# Patient Record
Sex: FEMALE | Race: BLACK OR AFRICAN AMERICAN | URBAN - METROPOLITAN AREA
[De-identification: names, ages, dates, MRNs, and addresses within clinical notes are randomized per-mention and may not be internally consistent; named-entity substitution may affect disease eponyms.]

---

## 2024-06-20 DIAGNOSIS — R51.9 LEFT-SIDED FACE PAIN: Primary | ICD-10-CM

## 2024-08-20 ENCOUNTER — OFFICE VISIT (OUTPATIENT)
Dept: OTOLARYNGOLOGY | Facility: CLINIC | Age: 54
End: 2024-08-20
Payer: MEDICAID

## 2024-08-20 VITALS
HEIGHT: 62 IN | DIASTOLIC BLOOD PRESSURE: 83 MMHG | WEIGHT: 188.63 LBS | SYSTOLIC BLOOD PRESSURE: 122 MMHG | HEART RATE: 74 BPM | BODY MASS INDEX: 34.71 KG/M2 | TEMPERATURE: 98 F

## 2024-08-20 DIAGNOSIS — H60.90 OTITIS EXTERNA, UNSPECIFIED CHRONICITY, UNSPECIFIED LATERALITY, UNSPECIFIED TYPE: ICD-10-CM

## 2024-08-20 DIAGNOSIS — M26.622 ARTHRALGIA OF LEFT TEMPOROMANDIBULAR JOINT: Primary | ICD-10-CM

## 2024-08-20 DIAGNOSIS — R44.8 FACIAL PRESSURE: ICD-10-CM

## 2024-08-20 DIAGNOSIS — J31.0 CHRONIC RHINITIS: ICD-10-CM

## 2024-08-20 DIAGNOSIS — M79.18 MYOFASCIAL PAIN: ICD-10-CM

## 2024-08-20 DIAGNOSIS — R51.9 LEFT-SIDED FACE PAIN: ICD-10-CM

## 2024-08-20 PROCEDURE — 4010F ACE/ARB THERAPY RXD/TAKEN: CPT | Mod: CPTII,,, | Performed by: NURSE PRACTITIONER

## 2024-08-20 PROCEDURE — 3079F DIAST BP 80-89 MM HG: CPT | Mod: CPTII,,, | Performed by: NURSE PRACTITIONER

## 2024-08-20 PROCEDURE — 99215 OFFICE O/P EST HI 40 MIN: CPT | Mod: PBBFAC | Performed by: NURSE PRACTITIONER

## 2024-08-20 PROCEDURE — 3074F SYST BP LT 130 MM HG: CPT | Mod: CPTII,,, | Performed by: NURSE PRACTITIONER

## 2024-08-20 PROCEDURE — 3008F BODY MASS INDEX DOCD: CPT | Mod: CPTII,,, | Performed by: NURSE PRACTITIONER

## 2024-08-20 PROCEDURE — 99204 OFFICE O/P NEW MOD 45 MIN: CPT | Mod: S$PBB,,, | Performed by: NURSE PRACTITIONER

## 2024-08-20 PROCEDURE — 1159F MED LIST DOCD IN RCRD: CPT | Mod: CPTII,,, | Performed by: NURSE PRACTITIONER

## 2024-08-20 RX ORDER — ESCITALOPRAM OXALATE 10 MG/1
10 TABLET ORAL
COMMUNITY
Start: 2024-06-06

## 2024-08-20 RX ORDER — ASPIRIN 81 MG/1
81 TABLET ORAL
COMMUNITY

## 2024-08-20 RX ORDER — SEMAGLUTIDE 1.34 MG/ML
1 INJECTION, SOLUTION SUBCUTANEOUS
COMMUNITY
Start: 2024-07-25

## 2024-08-20 RX ORDER — FAMOTIDINE 40 MG/1
40 TABLET, FILM COATED ORAL
COMMUNITY
Start: 2024-06-06

## 2024-08-20 RX ORDER — PEN NEEDLE, DIABETIC 32GX 5/32"
NEEDLE, DISPOSABLE MISCELLANEOUS
COMMUNITY
Start: 2024-06-28

## 2024-08-20 RX ORDER — METFORMIN HYDROCHLORIDE 750 MG/1
750 TABLET, EXTENDED RELEASE ORAL
COMMUNITY

## 2024-08-20 RX ORDER — LEVOCETIRIZINE DIHYDROCHLORIDE 5 MG/1
5 TABLET, FILM COATED ORAL
COMMUNITY
Start: 2024-05-01

## 2024-08-20 RX ORDER — FLUTICASONE PROPIONATE 50 MCG
2 SPRAY, SUSPENSION (ML) NASAL
COMMUNITY
Start: 2024-04-01

## 2024-08-20 RX ORDER — VALSARTAN 160 MG/1
160 TABLET ORAL
COMMUNITY
Start: 2024-05-30

## 2024-08-20 RX ORDER — SODIUM PICOSULFATE, MAGNESIUM OXIDE, AND ANHYDROUS CITRIC ACID 12; 3.5; 1 G/175ML; G/175ML; MG/175ML
LIQUID ORAL
COMMUNITY
Start: 2024-06-24

## 2024-08-20 RX ORDER — GLIPIZIDE 2.5 MG/1
1 TABLET, EXTENDED RELEASE ORAL DAILY
COMMUNITY

## 2024-08-20 RX ORDER — DICLOFENAC SODIUM 10 MG/G
2 GEL TOPICAL 4 TIMES DAILY
Qty: 200 G | Refills: 1 | Status: SHIPPED | OUTPATIENT
Start: 2024-08-20

## 2024-08-20 RX ORDER — INSULIN GLARGINE 100 [IU]/ML
INJECTION, SOLUTION SUBCUTANEOUS
COMMUNITY

## 2024-08-20 RX ORDER — PREGABALIN 150 MG/1
150 CAPSULE ORAL NIGHTLY
COMMUNITY
Start: 2024-04-30

## 2024-08-20 RX ORDER — RAMIPRIL 1.25 MG/1
1.25 CAPSULE ORAL
COMMUNITY

## 2024-08-20 RX ORDER — DULOXETIN HYDROCHLORIDE 60 MG/1
60 CAPSULE, DELAYED RELEASE ORAL NIGHTLY
COMMUNITY
Start: 2024-04-30

## 2024-08-20 RX ORDER — LORATADINE 10 MG/1
10 TABLET ORAL DAILY PRN
COMMUNITY
Start: 2024-04-11

## 2024-08-20 RX ORDER — CYCLOBENZAPRINE HCL 10 MG
10 TABLET ORAL
COMMUNITY

## 2024-08-20 RX ORDER — METHOCARBAMOL 750 MG/1
750 TABLET, FILM COATED ORAL 4 TIMES DAILY PRN
COMMUNITY
Start: 2024-06-07

## 2024-08-20 RX ORDER — PRAVASTATIN SODIUM 10 MG/1
1 TABLET ORAL DAILY
COMMUNITY

## 2024-08-20 RX ORDER — CALCIUM CARBONATE/VITAMIN D3 250-3.125
TABLET ORAL DAILY
COMMUNITY

## 2024-08-20 RX ORDER — TRIAMCINOLONE ACETONIDE 5 MG/G
OINTMENT TOPICAL 2 TIMES DAILY
Qty: 15 G | Refills: 1 | Status: SHIPPED | OUTPATIENT
Start: 2024-08-20

## 2024-08-20 RX ORDER — ATORVASTATIN CALCIUM 20 MG/1
20 TABLET, FILM COATED ORAL
COMMUNITY
Start: 2024-05-30

## 2024-08-20 NOTE — PROGRESS NOTES
[facility]  Otolaryngology Clinic Note    Roxi Sutherland  YOB: 1970    Chief Complaint:   Chief Complaint   Patient presents with    referral: Left Ear Pain        HPI: 08/20/2024: 54 y.o. female referred for otalgia. She c/o b/l intermittent ear pain and itching for years. States her ears are always dry and flaky, that it is embarrassing. States she was given a cream by PCP which did not help. Admits to frequent q-tip use. Endorses occasional clear rhinitis, L>R and left facial pressure. States her dentist told her that she has a tooth in her sinus and needed to see ENT. Denies any purulent rhinorrhea or hx of sinus infections. Endorses chronic pain to left jaw, postauricular area, and down left neck. States this hits her about 3 times weekly. On cymbalta, lyrica, & flexeril daily; hx of pain mgmt.       ROS:   10-point review of systems negative except per HPI      Review of patient's allergies indicates:   Allergen Reactions    Ace inhibitors Swelling     Tongue swelling       History reviewed. No pertinent past medical history.    History reviewed. No pertinent surgical history.    Social History     Socioeconomic History    Marital status:    Tobacco Use    Smoking status: Never     Passive exposure: Never    Smokeless tobacco: Never       No family history on file.    Outpatient Encounter Medications as of 8/20/2024   Medication Sig Dispense Refill    aspirin (ECOTRIN) 81 MG EC tablet Take 81 mg by mouth.      atorvastatin (LIPITOR) 20 MG tablet Take 20 mg by mouth.      calcium carbonate-vitamin D3 250-125 mg 250 mg-3.125 mcg (125 unit) Tab Take by mouth once daily.      DULoxetine (CYMBALTA) 60 MG capsule Take 60 mg by mouth every evening.      EScitalopram oxalate (LEXAPRO) 10 MG tablet Take 10 mg by mouth.      famotidine (PEPCID) 40 MG tablet Take 40 mg by mouth.      fluticasone propionate (FLONASE) 50 mcg/actuation nasal spray 2 sprays by Each Nostril route.    "   glipiZIDE (GLUCOTROL) 2.5 MG TR24 Take 1 tablet by mouth once daily.      LANTUS SOLOSTAR U-100 INSULIN 100 unit/mL (3 mL) InPn pen SMARTSI-70 Unit(s) SUB-Q Daily      loratadine (CLARITIN) 10 mg tablet Take 10 mg by mouth daily as needed.      metFORMIN (GLUCOPHAGE-XR) 750 MG ER 24hr tablet Take 750 mg by mouth.      methocarbamoL (ROBAXIN) 750 MG Tab Take 750 mg by mouth 4 (four) times daily as needed.      OZEMPIC 1 mg/dose (4 mg/3 mL) Inject 1 mg into the skin every 7 days.      pregabalin (LYRICA) 150 MG capsule Take 150 mg by mouth every evening.      ramipriL (ALTACE) 1.25 MG capsule Take 1.25 mg by mouth.      SURE COMFORT PEN NEEDLE 32 gauge x 5/32" Ndle USE AS DIRECTED DAILY      valsartan (DIOVAN) 160 MG tablet Take 160 mg by mouth.      CLENPIQ 10 mg-3.5 gram- 12 gram/175 mL Soln Take by mouth. (Patient not taking: Reported on 2024)      cyclobenzaprine (FLEXERIL) 10 MG tablet Take 10 mg by mouth. (Patient not taking: Reported on 2024)      DOCOSAHEXAENOIC ACID ORAL Take 1 capsule by mouth. (Patient not taking: Reported on 2024)      levocetirizine (XYZAL) 5 MG tablet Take 5 mg by mouth. (Patient not taking: Reported on 2024)      pravastatin (PRAVACHOL) 10 MG tablet Take 1 tablet by mouth once daily. (Patient not taking: Reported on 2024)       No facility-administered encounter medications on file as of 2024.       Physical Exam:  Vitals:    24 1515   BP: 122/83   BP Location: Right arm   Patient Position: Sitting   BP Method: Large (Automatic)   Pulse: 74   Temp: 97.7 °F (36.5 °C)   TempSrc: Oral   Weight: 85.5 kg (188 lb 9.6 oz)   Height: 5' 2" (1.575 m)       Physical Exam   General: NAD, voice normal  Neuro: AAO, CN II - XII grossly intact  Head/ Face: NCAT, symmetric, sensations intact bilaterally. + marked left TMJ TTP  Eyes: EOMI, PERRL  Ears: externally normal with grossly normal hearing  AD: EAC with dry skin/scaling around meatus- canal patent, TM " intact, no middle ear effusion, no retractions  AS:EAC with dry skin/scaling around meatus- canal patent, TM intact, no middle ear effusion, no retractions  Nose: bilateral nares patent, midline septum, no rhinorrhea, no external deformity, no turbinate hypertrophy  OC/OP: MMM, no intraoral lesions, no trismus, dentition is moderate/largely edentulous molars, no uvular deviation, bilaterally symmetric soft palate elevation, palatoglossus and palatopharyngeal fold wnl; tonsils are symmetric and 1+  Indirect laryngoscopy: deferred due to patient intolerance  Neck: soft, supple, no LAD, normal ROM, no thyromegaly. Marked tenderness of left SCM  Respiratory: nonlabored, no wheezing, bilateral chest rise  Cardiovascular: RRR  Gastrointestinal: S NT ND  Skin: warm, no lesions  Musculoskeletal: 5/5 strength  Psych: Appropriate affect/mood     Pertinent Data:  ? LABS:  ? AUDIO:           ? PATH:      Imaging:   I personally reviewed the following images:        Assessment/Plan:  54 y.o. female with b/l OE & dermatitis, left TMJ & myofascial pain, AR. D/w Dr. Sheppard.  - Triamcinolone to b/l ear canals BID x 1mo  - Warm/cool compresses to left jaw and neck  - TMJ & neck exercises daily  - Voltaren gel QID prn for left jaw/neck pain  - NSI BID  - Flonase BID  - RTC 2-3mo. Will consider nasal endoscopy +/- CT sinus if no improvement    Sera Awad NP

## 2024-11-18 NOTE — PROGRESS NOTES
[facility]  Otolaryngology Clinic Note    Roxi Sutherland  YOB: 1970    Chief Complaint:   Chief Complaint   Patient presents with    referral: Left Ear Pain        HPI: 08/20/2024: 54 y.o. female referred for otalgia. She c/o b/l intermittent ear pain and itching for years. States her ears are always dry and flaky, that it is embarrassing. States she was given a cream by PCP which did not help. Admits to frequent q-tip use. Endorses occasional clear rhinitis, L>R and left facial pressure. States her dentist told her that she has a tooth in her sinus and needed to see ENT. Denies any purulent rhinorrhea or hx of sinus infections. Endorses chronic pain to left jaw, postauricular area, and down left neck. States this hits her about 3 times weekly. On cymbalta, lyrica, & flexeril daily; hx of pain mgmt.     11/19/2024: Reports symptoms have resolved. She is doing very well. No c/o today.    ROS:   10-point review of systems negative except per HPI      Review of patient's allergies indicates:   Allergen Reactions    Ace inhibitors Swelling     Tongue swelling       History reviewed. No pertinent past medical history.    History reviewed. No pertinent surgical history.    Social History     Socioeconomic History    Marital status:    Tobacco Use    Smoking status: Never     Passive exposure: Never    Smokeless tobacco: Never       No family history on file.    Outpatient Encounter Medications as of 8/20/2024   Medication Sig Dispense Refill    aspirin (ECOTRIN) 81 MG EC tablet Take 81 mg by mouth.      atorvastatin (LIPITOR) 20 MG tablet Take 20 mg by mouth.      calcium carbonate-vitamin D3 250-125 mg 250 mg-3.125 mcg (125 unit) Tab Take by mouth once daily.      DULoxetine (CYMBALTA) 60 MG capsule Take 60 mg by mouth every evening.      EScitalopram oxalate (LEXAPRO) 10 MG tablet Take 10 mg by mouth.      famotidine (PEPCID) 40 MG tablet Take 40 mg by mouth.      fluticasone  "propionate (FLONASE) 50 mcg/actuation nasal spray 2 sprays by Each Nostril route.      glipiZIDE (GLUCOTROL) 2.5 MG TR24 Take 1 tablet by mouth once daily.      LANTUS SOLOSTAR U-100 INSULIN 100 unit/mL (3 mL) InPn pen SMARTSI-70 Unit(s) SUB-Q Daily      loratadine (CLARITIN) 10 mg tablet Take 10 mg by mouth daily as needed.      metFORMIN (GLUCOPHAGE-XR) 750 MG ER 24hr tablet Take 750 mg by mouth.      methocarbamoL (ROBAXIN) 750 MG Tab Take 750 mg by mouth 4 (four) times daily as needed.      OZEMPIC 1 mg/dose (4 mg/3 mL) Inject 1 mg into the skin every 7 days.      pregabalin (LYRICA) 150 MG capsule Take 150 mg by mouth every evening.      ramipriL (ALTACE) 1.25 MG capsule Take 1.25 mg by mouth.      SURE COMFORT PEN NEEDLE 32 gauge x 5/32" Ndle USE AS DIRECTED DAILY      valsartan (DIOVAN) 160 MG tablet Take 160 mg by mouth.      CLENPIQ 10 mg-3.5 gram- 12 gram/175 mL Soln Take by mouth. (Patient not taking: Reported on 2024)      cyclobenzaprine (FLEXERIL) 10 MG tablet Take 10 mg by mouth. (Patient not taking: Reported on 2024)      DOCOSAHEXAENOIC ACID ORAL Take 1 capsule by mouth. (Patient not taking: Reported on 2024)      levocetirizine (XYZAL) 5 MG tablet Take 5 mg by mouth. (Patient not taking: Reported on 2024)      pravastatin (PRAVACHOL) 10 MG tablet Take 1 tablet by mouth once daily. (Patient not taking: Reported on 2024)       No facility-administered encounter medications on file as of 2024.       Physical Exam:  Vitals:    24 1515   BP: 122/83   BP Location: Right arm   Patient Position: Sitting   BP Method: Large (Automatic)   Pulse: 74   Temp: 97.7 °F (36.5 °C)   TempSrc: Oral   Weight: 85.5 kg (188 lb 9.6 oz)   Height: 5' 2" (1.575 m)       Physical Exam   General: NAD, voice normal  Neuro: AAO, CN II - XII grossly intact  Head/ Face: NCAT, symmetric, sensations intact bilaterally. + marked left TMJ TTP  Eyes: EOMI, PERRL  Ears: externally normal with " grossly normal hearing  AD: EAC patent, TM intact, no middle ear effusion, no retractions  AS: EAC patent, TM intact, no middle ear effusion, no retractions  Nose: bilateral nares patent, midline septum, no rhinorrhea, no external deformity, no turbinate hypertrophy  OC/OP: MMM, no intraoral lesions, no trismus, dentition is moderate/largely edentulous molars, no uvular deviation, bilaterally symmetric soft palate elevation, palatoglossus and palatopharyngeal fold wnl; tonsils are symmetric and 1+  Indirect laryngoscopy: deferred due to patient intolerance  Neck: soft, supple, no LAD, normal ROM, no thyromegaly. Marked tenderness of left SCM  Respiratory: nonlabored, no wheezing, bilateral chest rise  Cardiovascular: RRR  Gastrointestinal: S NT ND  Skin: warm, no lesions  Musculoskeletal: 5/5 strength  Psych: Appropriate affect/mood     Pertinent Data:  ? LABS:  ? AUDIO:           ? PATH:      Imaging:   I personally reviewed the following images:        Assessment/Plan:  54 y.o. female with b/l OE & dermatitis, left TMJ & myofascial pain, AR. Currently well controlled.  - Conservative mgmt of TMJ arthralgia  - NSI BID  - Flonase BID  - RTC PRN    Sera Awad NP

## 2024-11-19 ENCOUNTER — OFFICE VISIT (OUTPATIENT)
Dept: OTOLARYNGOLOGY | Facility: CLINIC | Age: 54
End: 2024-11-19
Payer: MEDICAID

## 2024-11-19 VITALS — TEMPERATURE: 99 F | HEART RATE: 80 BPM | SYSTOLIC BLOOD PRESSURE: 132 MMHG | DIASTOLIC BLOOD PRESSURE: 87 MMHG

## 2024-11-19 DIAGNOSIS — M79.18 MYOFASCIAL PAIN: ICD-10-CM

## 2024-11-19 DIAGNOSIS — M26.622 ARTHRALGIA OF LEFT TEMPOROMANDIBULAR JOINT: Primary | ICD-10-CM

## 2024-11-19 DIAGNOSIS — H60.90 OTITIS EXTERNA, UNSPECIFIED CHRONICITY, UNSPECIFIED LATERALITY, UNSPECIFIED TYPE: ICD-10-CM

## 2024-11-19 PROCEDURE — 4010F ACE/ARB THERAPY RXD/TAKEN: CPT | Mod: CPTII,,, | Performed by: NURSE PRACTITIONER

## 2024-11-19 PROCEDURE — 3075F SYST BP GE 130 - 139MM HG: CPT | Mod: CPTII,,, | Performed by: NURSE PRACTITIONER

## 2024-11-19 PROCEDURE — 99214 OFFICE O/P EST MOD 30 MIN: CPT | Mod: PBBFAC | Performed by: NURSE PRACTITIONER

## 2024-11-19 PROCEDURE — 1159F MED LIST DOCD IN RCRD: CPT | Mod: CPTII,,, | Performed by: NURSE PRACTITIONER

## 2024-11-19 PROCEDURE — 99213 OFFICE O/P EST LOW 20 MIN: CPT | Mod: S$PBB,,, | Performed by: NURSE PRACTITIONER

## 2024-11-19 PROCEDURE — 3079F DIAST BP 80-89 MM HG: CPT | Mod: CPTII,,, | Performed by: NURSE PRACTITIONER

## 2025-02-20 DIAGNOSIS — M19.90 OSTEOARTHRITIS, UNSPECIFIED OSTEOARTHRITIS TYPE, UNSPECIFIED SITE: Primary | ICD-10-CM
